# Patient Record
Sex: MALE | Race: BLACK OR AFRICAN AMERICAN | NOT HISPANIC OR LATINO | Employment: STUDENT | URBAN - METROPOLITAN AREA
[De-identification: names, ages, dates, MRNs, and addresses within clinical notes are randomized per-mention and may not be internally consistent; named-entity substitution may affect disease eponyms.]

---
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
## 2023-04-14 ENCOUNTER — OFFICE VISIT (OUTPATIENT)
Dept: URGENT CARE | Facility: CLINIC | Age: 23
End: 2023-04-14
Payer: COMMERCIAL

## 2023-04-14 VITALS
BODY MASS INDEX: 24.44 KG/M2 | DIASTOLIC BLOOD PRESSURE: 56 MMHG | HEART RATE: 69 BPM | TEMPERATURE: 99 F | RESPIRATION RATE: 20 BRPM | SYSTOLIC BLOOD PRESSURE: 109 MMHG | OXYGEN SATURATION: 98 % | WEIGHT: 165 LBS | HEIGHT: 69 IN

## 2023-04-14 DIAGNOSIS — L73.0 ACNE KELOID: Primary | ICD-10-CM

## 2023-04-14 PROCEDURE — 99213 PR OFFICE/OUTPT VISIT, EST, LEVL III, 20-29 MIN: ICD-10-PCS | Mod: S$GLB,,,

## 2023-04-14 PROCEDURE — 99213 OFFICE O/P EST LOW 20 MIN: CPT | Mod: S$GLB,,,

## 2023-04-14 RX ORDER — TRIAMCINOLONE ACETONIDE 1 MG/G
OINTMENT TOPICAL 2 TIMES DAILY
Qty: 30 G | Refills: 0 | Status: SHIPPED | OUTPATIENT
Start: 2023-04-14 | End: 2023-04-21

## 2023-04-14 RX ORDER — CLINDAMYCIN AND BENZOYL PEROXIDE 10; 50 MG/G; MG/G
GEL TOPICAL 2 TIMES DAILY
Qty: 35 G | Refills: 0 | Status: SHIPPED | OUTPATIENT
Start: 2023-04-14 | End: 2023-04-28

## 2023-04-14 NOTE — PATIENT INSTRUCTIONS
A referral for Dermatology has been placed. Call [phone] to schedule an appointment.     - You must understand that you have received an Urgent Care treatment only and that you may be released before all of your medical problems are known or treated.   - You, the patient, will arrange for follow up care as instructed.   - If your condition worsens or fails to improve we recommend that you receive another evaluation at the ER immediately or contact your PCP to discuss your concerns or return here.   - Follow up with your PCP or specialty clinic as directed in the next 1-2 weeks if not improved or as needed.  You can call [phone] to schedule an appointment with the appropriate provider.    If your symptoms do not improve or worsen, go to the emergency room immediately.

## 2023-04-14 NOTE — PROGRESS NOTES
"Subjective:      Patient ID: Irasema Hidalgo Jr. is a 22 y.o. male.    Vitals:  height is 5' 9" (1.753 m) and weight is 74.8 kg (165 lb). His temperature is 98.5 °F (36.9 °C). His blood pressure is 109/56 (abnormal) and his pulse is 69. His respiration is 20 and oxygen saturation is 98%.     Chief Complaint: Rash    This is a 22 y.o. male who presents today with a chief complaint of rash on the back of neck. Pt states that the rash [place] and that's when the rash appeared. He states that it is sometimes itchy. No pain. Has not spread. That was his first time getting a haircut and has not had another one since.       Rash  This is a new problem. The current episode started more than 1 month ago. The problem is unchanged. The affected locations include the neck. The rash is characterized by itchiness and redness. He was exposed to nothing. Pertinent negatives include no congestion, cough, eye pain, fatigue or vomiting. Past treatments include nothing. The treatment provided no relief.     Constitution: Negative for sweating and fatigue.   HENT:  Negative for ear pain and congestion.    Eyes:  Negative for eye pain and eye redness.   Respiratory:  Negative for cough.    Gastrointestinal:  Negative for nausea and vomiting.   Skin:  Positive for rash. Negative for erythema and hives.   Allergic/Immunologic: Positive for itching. Negative for hives.   Neurological:  Negative for disorientation and altered mental status.   Psychiatric/Behavioral:  Negative for altered mental status and disorientation.     Objective:     Physical Exam   Constitutional: He is oriented to person, place, and time. He appears well-developed.      Comments:Patient sits comfortably in exam chair. Answers questions in complete sentences. Does not show any signs of distress or discoloration.        HENT:   Head: Normocephalic and atraumatic. Head is without abrasion, without contusion and without " laceration.       Ears:   Right Ear: External ear normal.   Left Ear: External ear normal.   Nose: Nose normal.   Mouth/Throat: Oropharynx is clear and moist and mucous membranes are normal.   Multiple firm nodules noted to the back of the back scalp. No surrounding erythema or swelling.       Comments: Multiple firm nodules noted to the back of the back scalp. No surrounding erythema or swelling.   Eyes: Conjunctivae, EOM and lids are normal. Pupils are equal, round, and reactive to light.   Neck: Trachea normal and phonation normal. Neck supple.   Cardiovascular: Normal rate, regular rhythm and normal heart sounds.   Pulmonary/Chest: Effort normal and breath sounds normal. No stridor. No respiratory distress.   Musculoskeletal: Normal range of motion.         General: Normal range of motion.   Neurological: He is alert and oriented to person, place, and time.   Skin: Skin is warm, dry, intact and rash. Capillary refill takes less than 2 seconds. No abrasion, No burn, No bruising, No erythema and No ecchymosis   Psychiatric: His speech is normal and behavior is normal. Judgment and thought content normal.   Nursing note and vitals reviewed.      Assessment:     1. Acne keloid        Plan:     Patient will try topical steroid and antibiotic for 1-2 weeks. If symptoms do not resolved he will f/u with derm. Referral already placed and phone number given.     Acne keloid  -     triamcinolone acetonide 0.1% (KENALOG) 0.1 % ointment; Apply topically 2 (two) times daily. for 7 days  Dispense: 30 g; Refill: 0  -     Ambulatory referral/consult to Dermatology  -     clindamycin-benzoyl peroxide (BENZACLIN) gel; Apply topically 2 (two) times daily. for 14 days  Dispense: 35 g; Refill: 0                  Patient Instructions   A referral for Dermatology has been placed. Call [phone] to schedule an appointment.     - You must understand that you have received an Urgent Care treatment only and that you may be released  before all of your medical problems are known or treated.   - You, the patient, will arrange for follow up care as instructed.   - If your condition worsens or fails to improve we recommend that you receive another evaluation at the ER immediately or contact your PCP to discuss your concerns or return here.   - Follow up with your PCP or specialty clinic as directed in the next 1-2 weeks if not improved or as needed.  You can call [phone] to schedule an appointment with the appropriate provider.    If your symptoms do not improve or worsen, go to the emergency room immediately.

## 2023-06-01 ENCOUNTER — TELEPHONE (OUTPATIENT)
Dept: SPORTS MEDICINE | Facility: CLINIC | Age: 23
End: 2023-06-01
Payer: COMMERCIAL

## 2023-06-01 NOTE — TELEPHONE ENCOUNTER
"Attempted to contact pt & pt's mother, Tc.. Left voicemail. Called to obtain additional information regarding his injury. Asked pt/s pt's mother to return call to clinic at [phone].     ==  Decision tree response:  "Are you seeking a second opinion or have you had surgery within the last year for this problem?"      Yes       "Have you been told you need a surgery or do you want surgery?"      Yes        "

## 2023-06-02 ENCOUNTER — TELEPHONE (OUTPATIENT)
Dept: SPORTS MEDICINE | Facility: CLINIC | Age: 23
End: 2023-06-02
Payer: COMMERCIAL

## 2023-06-02 NOTE — TELEPHONE ENCOUNTER
Spoke c pt's mother. 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 He has been dealing c R knee pain for the past 5 months. He has had 2 MRIs & completed physical therapy with initial improvement but pain has since worsened. She confirmed that pt has exhausted conservative treatment & is requesting surgical consult. R/s appt c Dr. Rees 06/08/23 to Dr. Rodriguez 06/06/23. Confirmed appt date, time, location. Pt will be bringing MRI discs for review. Pt/Pt's mother will call c additional questions/concerns in interim. Pt expressed understanding & was thankful.

## 2023-06-02 NOTE — TELEPHONE ENCOUNTER
"2nd attempt.     ==  Attempted to contact pt. Left voicemail. Called to obtain additional information regarding his injury. Asked pt/s pt's mother to return call to clinic at [phone].     MyChart sent.      ==  Decision tree response:  "Are you seeking a second opinion or have you had surgery within the last year for this problem?"      Yes        "Have you been told you need a surgery or do you want surgery?"      Yes      "

## 2023-06-06 ENCOUNTER — OFFICE VISIT (OUTPATIENT)
Dept: SPORTS MEDICINE | Facility: CLINIC | Age: 23
End: 2023-06-06
Payer: COMMERCIAL

## 2023-06-06 ENCOUNTER — HOSPITAL ENCOUNTER (OUTPATIENT)
Dept: RADIOLOGY | Facility: HOSPITAL | Age: 23
Discharge: HOME | End: 2023-06-06
Attending: ORTHOPAEDIC SURGERY
Payer: COMMERCIAL

## 2023-06-06 VITALS
WEIGHT: 174 LBS | DIASTOLIC BLOOD PRESSURE: 79 MMHG | BODY MASS INDEX: 25.77 KG/M2 | HEIGHT: 69 IN | HEART RATE: 60 BPM | SYSTOLIC BLOOD PRESSURE: 113 MMHG

## 2023-06-06 DIAGNOSIS — M25.561 RIGHT KNEE PAIN, UNSPECIFIED CHRONICITY: Primary | ICD-10-CM

## 2023-06-06 DIAGNOSIS — M25.561 RIGHT KNEE PAIN, UNSPECIFIED CHRONICITY: ICD-10-CM

## 2023-06-06 PROCEDURE — 3078F PR MOST RECENT DIASTOLIC BLOOD PRESSURE < 80 MM HG: ICD-10-PCS | Mod: CPTII,S$GLB,, | Performed by: ORTHOPAEDIC SURGERY

## 2023-06-06 PROCEDURE — 3074F PR MOST RECENT SYSTOLIC BLOOD PRESSURE < 130 MM HG: ICD-10-PCS | Mod: CPTII,S$GLB,, | Performed by: ORTHOPAEDIC SURGERY

## 2023-06-06 PROCEDURE — 1159F PR MEDICATION LIST DOCUMENTED IN MEDICAL RECORD: ICD-10-PCS | Mod: CPTII,S$GLB,, | Performed by: ORTHOPAEDIC SURGERY

## 2023-06-06 PROCEDURE — 99999 PR PBB SHADOW E&M-EST. PATIENT-LVL III: ICD-10-PCS | Mod: PBBFAC,,, | Performed by: ORTHOPAEDIC SURGERY

## 2023-06-06 PROCEDURE — 1159F MED LIST DOCD IN RCRD: CPT | Mod: CPTII,S$GLB,, | Performed by: ORTHOPAEDIC SURGERY

## 2023-06-06 PROCEDURE — 99999 PR PBB SHADOW E&M-EST. PATIENT-LVL III: CPT | Mod: PBBFAC,,, | Performed by: ORTHOPAEDIC SURGERY

## 2023-06-06 PROCEDURE — 3078F DIAST BP <80 MM HG: CPT | Mod: CPTII,S$GLB,, | Performed by: ORTHOPAEDIC SURGERY

## 2023-06-06 PROCEDURE — 3074F SYST BP LT 130 MM HG: CPT | Mod: CPTII,S$GLB,, | Performed by: ORTHOPAEDIC SURGERY

## 2023-06-06 PROCEDURE — 99203 PR OFFICE/OUTPT VISIT, NEW, LEVL III, 30-44 MIN: ICD-10-PCS | Mod: S$GLB,,, | Performed by: ORTHOPAEDIC SURGERY

## 2023-06-06 PROCEDURE — 3008F BODY MASS INDEX DOCD: CPT | Mod: CPTII,S$GLB,, | Performed by: ORTHOPAEDIC SURGERY

## 2023-06-06 PROCEDURE — 99203 OFFICE O/P NEW LOW 30 MIN: CPT | Mod: S$GLB,,, | Performed by: ORTHOPAEDIC SURGERY

## 2023-06-06 PROCEDURE — 3008F PR BODY MASS INDEX (BMI) DOCUMENTED: ICD-10-PCS | Mod: CPTII,S$GLB,, | Performed by: ORTHOPAEDIC SURGERY

## 2023-06-12 ENCOUNTER — CLINICAL SUPPORT (OUTPATIENT)
Dept: REHABILITATION | Facility: HOSPITAL | Age: 23
End: 2023-06-12
Payer: COMMERCIAL

## 2023-06-12 DIAGNOSIS — M25.561 ACUTE PAIN OF RIGHT KNEE: ICD-10-CM

## 2023-06-12 DIAGNOSIS — R26.9 GAIT ABNORMALITY: ICD-10-CM

## 2023-06-12 DIAGNOSIS — M25.661 DECREASED RANGE OF MOTION (ROM) OF RIGHT KNEE: ICD-10-CM

## 2023-06-12 PROCEDURE — 97110 THERAPEUTIC EXERCISES: CPT

## 2023-06-12 PROCEDURE — 97161 PT EVAL LOW COMPLEX 20 MIN: CPT

## 2023-06-14 ENCOUNTER — CLINICAL SUPPORT (OUTPATIENT)
Dept: REHABILITATION | Facility: HOSPITAL | Age: 23
End: 2023-06-14
Payer: COMMERCIAL

## 2023-06-14 DIAGNOSIS — R26.9 GAIT ABNORMALITY: ICD-10-CM

## 2023-06-14 DIAGNOSIS — M25.561 ACUTE PAIN OF RIGHT KNEE: Primary | ICD-10-CM

## 2023-06-14 DIAGNOSIS — M25.661 DECREASED RANGE OF MOTION (ROM) OF RIGHT KNEE: ICD-10-CM

## 2023-06-14 PROCEDURE — 97530 THERAPEUTIC ACTIVITIES: CPT

## 2023-06-14 PROCEDURE — 97110 THERAPEUTIC EXERCISES: CPT

## 2023-06-14 PROCEDURE — 97112 NEUROMUSCULAR REEDUCATION: CPT

## 2023-06-14 NOTE — PLAN OF CARE
[facility]  Physical Therapy Initial Evaluation    Date: 6/12/2023   Name: Irasema Hidalgo Jr.  Clinic Number: [id number]    Therapy Diagnosis:   Encounter Diagnoses   Name Primary?    Acute pain of right knee     Gait abnormality     Decreased range of motion (ROM) of right knee      Physician: Chidi Rodriguez MDdd    Physician Orders: PT Eval and Treat   Medical Diagnosis from Referral: Right knee pain, unspecified chronicity [M25.561]  Evaluation Date: 6/12/2023d  Authorization Period Expiration: 6/5/24  Plan of Care Expiration: 9/1/23  Progress Note Due: 7/26/23  Visit # / Visits authorized: 1/ 1   FOTO Follow Up:   FOTO Follow UP:     Precautions: Standard    Time In: 10:15 AM   Time Out: 11:10 AM   Total Appointment Time (timed & untimed codes): 55 minutes    Subjective     Date of onset: 3 months ago   History of current condition - Irasema reports: [place] he felt an incidence of posterior pain knee and swelling while sprinting. He did some PT at the time that he felt helped a bit was not able to complete the season. Since then he has not noticed much pain except occasional pain while performing stairs and walking. Has not been performing any basketball activities since the injury and since returning home. 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 Was told that current issue has to do with his meniscus. Notes that he feels stiffer in ROM on affected side especially with prolonged sitting.     Falls: none noted     Imaging Some reported that suggested meniscal irritation     Prior Therapy: some for current issue.   Exercise Routine/Sport Participation: professional  in [place]   Social History: unremarkable  Prior Level of Function: Full participation in ADL's and basketball activities   Current Level of Function: Unable to perform basketball activities d/t knee pain and feelings  of stiffness.     Pain:  Current 0/10, worst 5/10, best 0/10   Location: right knee  Description: Sharp  Aggravating Factors: Walking and sprinting and jumping  Easing Factors: rest    Pts goals: return to basketball activities without pain or instability       Medical History:   Past Medical History:   Diagnosis Date    Asthma        Surgical History:   Irasema Hidalgo Jr.  has no past surgical history on file.    Medications:   Irasema has a current medication list which includes the following prescription(s): albuterol, clindamycin-benzoyl peroxide, and triamcinolone acetonide 0.1%.    Allergies:   Review of patient's allergies indicates:  No Known Allergies     Objective     Posture: WNL    Functional Tests:  Gait: WNL  Squat: B/L valgus noted, decreased anterior tibial progression, posterior weight shift b/l   SL Squat Test: R: dynamic valgus ; L WNL      Knee Passive Range of Motion:   Right  Left    Flexion 130 130   Extension 3 5       Hip Passive Range of Motion:   Right  Left    Flexion 95 95   Extension 10 10   Ext. Rotation 55 55   Int. Rotation 25 25       Ankle Passive Range of Motion:   Right  Left    Dorsiflexion 35 37   1st MTP Extension WNL WNL       Lower Extremity Strength:   Right  Left    Quadriceps: 82.8 lb 83.0 lb   Hamstring in seated 62.0 lb  57.0 lb    Iliopsoas (sitting): 5/5 5/5   Hip extension:  4/5 4/5   Supine Abduction  36.6 lb 31.5 lb      Calf Raise Endurance Test on Metronome  R: 33   L: 33     Special Tests:   Right Left   Valgus Stress  - -   Varus Stress  - -   Lachman's  - -   Posterior Drawer - -      Right Left   Thessaly's Test - -   McMurrays  - -   Apleys Compression/  Distraction - -      Right Left   Patella Grind + +   Patella Apprehension - -       Joint Mobility: Slight TF limitation in extension R      Palpation: not TTP     Flexibility:    Ely's test: R + ; L +    Hamstrings: R - ; L  -             Treatment     Treatment Time In: 11:00 AM   Treatment Time  Out: 11:10 AM   Total Treatment time separate from Evaluation: 10 minutes     Irasema received the treatments listed below:      Therapeutic exercises to develop strength, endurance, ROM, and flexibility for 10 minutes including:  Pin and Stretch   Squat Retraining w/GTB     Home Exercises and Patient Education Provided     Education provided:   - Prognosis, activity modification, goals for therapy, role of therapy for care, exercises/HEP    Written Home Exercises Provided: Yes  Exercises were reviewed and Irasema was able to demonstrate them prior to the end of the session.   Pt received a written copy of exercises to perform at home. Irasema demonstrated good  understanding of the education provided.     See EMR under patient instructions for exercises given.     Assessment     Irasema is a 22 y.o. male referred to outpatient Physical Therapy with s/s consistent with patellafemoral pain syndrome and meniscal irritation. He presents with limitations in ankle ROM and motor control of functional movements including double and single leg squatting. He will benefit from skilled OP PT to address these limitations and return to professional basketball.       Pt will benefit from skilled outpatient Physical Therapy to address the deficits stated above and in the chart below, provide pt/family education, and to maximize pt's level of independence. Pt prognosis is Excellent.     Plan of care discussed with patient: Yes  Pt's spiritual, cultural and educational needs considered and patient is agreeable to the plan of care and goals as stated below:       Anticipated Barriers for therapy: none       Medical Necessity is demonstrated by the following  History  Co-morbidities and personal factors that may impact the plan of care Co-morbidities:   See PMH     Personal Factors:   no deficits     low   Examination  Body Structures and Functions, activity limitations and participation restrictions that may impact the plan of  care Body Regions:   lower extremities    Body Systems:    ROM  strength  gross coordinated movement  balance    Participation Restrictions:   Professional basketball     Activity limitations:   Learning and applying knowledge  no deficits    General Tasks and Commands  no deficits    Communication  no deficits    Mobility  Running/cutting     Self care  no deficits    Domestic Life  no deficits    Interactions/Relationships  no deficits    Life Areas  no deficits    Community and Social Life  no deficits         low   Clinical Presentation stable and uncomplicated low   Decision Making/ Complexity Score: low     Goals:  Short Term Goals: 4-6 weeks  1. Pt will be compliant with HEP 50% of prescribed amount.   2. The pt to demo improvement in b/l squatting technique with minimal valgus b/l   3.  Pt to demo single leg squat to appropriate depth without dynamic valgus     Long Term Goals: 8-12 weeks   Pt will be compliant with % of prescribed amount.   Pt will demonstrate uncompensated pain free running mechanics x 10:0   Pt will demo good tolerance to cutting/change of direction exercises to demo improved tolerance to basketball activities.   The pt will report full participation in ADLs and IADLs without restrictions related to R knee .     Plan   Plan of care Certification: 6/12/2023 to 9/1/23.    Outpatient Physical Therapy 2 times weekly for 12 weeks to include the following interventions: Manual Therapy, Neuromuscular Re-ed, Patient Education, Therapeutic Activities, and Therapeutic Exercise.     Erik Juarez, PT , DPT

## 2023-06-15 NOTE — PROGRESS NOTES
[facility]   Physical Therapy Treatment Note     Name: Irasema Hidalgo Jr.  Clinic Number: [id number]    Therapy Diagnosis:   Encounter Diagnoses   Name Primary?    Acute pain of right knee Yes    Gait abnormality     Decreased range of motion (ROM) of right knee      Physician: Chidi Rodriguez MD    Visit Date: 6/14/2023       Physician Orders: PT Eval and Treat   Medical Diagnosis from Referral: Right knee pain, unspecified chronicity [M25.561]  Evaluation Date: 6/12/2023d  Authorization Period Expiration: 6/5/24  Plan of Care Expiration: 9/1/23  Progress Note Due: 7/26/23  Visit # / Visits authorized: 1 / 20   FOTO Follow Up:   FOTO Follow UP:      Precautions: Standard     Time In: 8:15 AM   Time Out: 9:25 AM    Total Appointment Time (timed & untimed codes): 70 minutes    FOTO 1st:   FOTO 3rd:  FOTO 10th:      SUBJECTIVE     Pt reports: has been working on squatting.    He was compliant with home exercise program.  Response to previous treatment: too early   Functional change: too early     Pain: 0/10  Location: right knee      OBJECTIVE     Objective Measures updated at progress report unless specified.     Treatment       Irasema received the treatments listed below:      Therapeutic exercises to develop strength, endurance, ROM, and flexibility for 26 minutes including:  Pin and stretch 3 x 15   Watt Bike Intervals 10 x :30 hard/:30 recovery   Single Leg Leg Press 4 x 6 @ 160# per leg     Manual therapy techniques: Joint mobilizations were applied to the: R knee for 00 minutes, including:      Therapeutic activities to improve functional performance for 24  minutes, including:  Squat retraining, GTB, bench cue for knee translation 3 x 15   TRX Single Leg Squat 3 x 10   Sneaky Calf Raise 3 x 10 + 25# DB's     Neuromuscular re-education activities to improve: Balance, Coordination, Kinesthetic, and Sense for 20 minutes. The following activities were included:  Palloff Press  Walkouts 3 x 15 13# cable   Walking Single Leg Squat GTB 3 x LOT         Patient Education and Home Exercises     Home Exercises Provided and Patient Education Provided     Education provided:   - Decreased dynamic valgus, importance of regaining conditioning, muscular tolerance to basketball activities     Written Home Exercises Provided: Patient instructed to cont prior HEP. Exercises were reviewed and Irasema was able to demonstrate them prior to the end of the session.  Irasema demonstrated good  understanding of the education provided. See EMR under Patient Instructions for exercises provided during therapy sessions    ASSESSMENT     Irasema with some dynamic valgus b/l with squatting movements. Some navicular drop noted on L foot and decreased active DF, plan to assess ankle joint and ROM next session.     Irasema Is progressing well towards his goals.     Pt prognosis is Excellent.     Pt will continue to benefit from skilled outpatient physical therapy to address the deficits listed in the problem list box on initial evaluation, provide pt/family education and to maximize pt's level of independence in the home and community environment.     Pt's spiritual, cultural and educational needs considered and pt agreeable to plan of care and goals.     Anticipated barriers to physical therapy: none     Goals:   Short Term Goals: 4-6 weeks  1. Pt will be compliant with HEP 50% of prescribed amount.   2. The pt to demo improvement in b/l squatting technique with minimal valgus b/l   3.  Pt to demo single leg squat to appropriate depth without dynamic valgus      Long Term Goals: 8-12 weeks   Pt will be compliant with % of prescribed amount.   Pt will demonstrate uncompensated pain free running mechanics x 10:0   Pt will demo good tolerance to cutting/change of direction exercises to demo improved tolerance to basketball activities.   The pt will report full participation in ADLs and IADLs without  restrictions related to R knee .      Plan   Plan of care Certification: 6/12/2023 to 9/1/23.     Outpatient Physical Therapy 2 times weekly for 12 weeks to include the following interventions: Manual Therapy, Neuromuscular Re-ed, Patient Education, Therapeutic Activities, and Therapeutic Exercise.     Erik Juarez, PT PT, DPT

## 2023-06-20 ENCOUNTER — CLINICAL SUPPORT (OUTPATIENT)
Dept: REHABILITATION | Facility: HOSPITAL | Age: 23
End: 2023-06-20
Payer: COMMERCIAL

## 2023-06-20 DIAGNOSIS — M25.561 ACUTE PAIN OF RIGHT KNEE: Primary | ICD-10-CM

## 2023-06-20 DIAGNOSIS — M25.661 DECREASED RANGE OF MOTION (ROM) OF RIGHT KNEE: ICD-10-CM

## 2023-06-20 DIAGNOSIS — R26.9 GAIT ABNORMALITY: ICD-10-CM

## 2023-06-20 PROCEDURE — 97140 MANUAL THERAPY 1/> REGIONS: CPT

## 2023-06-20 PROCEDURE — 97110 THERAPEUTIC EXERCISES: CPT

## 2023-06-20 PROCEDURE — 97530 THERAPEUTIC ACTIVITIES: CPT

## 2023-06-20 NOTE — PROGRESS NOTES
[facility]   Physical Therapy Treatment Note     Name: Irasema Hidalgo Jr.  Clinic Number: [id number]    Therapy Diagnosis:   Encounter Diagnoses   Name Primary?    Acute pain of right knee Yes    Gait abnormality     Decreased range of motion (ROM) of right knee      Physician: Chidi Rodriguez MD    Visit Date: 6/20/2023       Physician Orders: PT Eval and Treat   Medical Diagnosis from Referral: Right knee pain, unspecified chronicity [M25.561]  Evaluation Date: 6/12/2023d  Authorization Period Expiration: 6/5/24  Plan of Care Expiration: 9/1/23  Progress Note Due: 7/26/23  Visit # / Visits authorized: 2 / 20   FOTO Follow Up:   FOTO Follow UP:      Precautions: Standard     Time In: 7:10 AM   Time Out: 8:15 AM    Total Appointment Time (timed & untimed codes): 65 minutes    FOTO 1st:   FOTO 3rd:  FOTO 10th:      SUBJECTIVE     Pt reports: over the weekend played pickup basketball on 2 occasions and noticed some mild pain in his R Achilles during cutting/change of direction.     He was compliant with home exercise program.  Response to previous treatment: too early   Functional change: too early     Pain: 0/10  Location: right knee      OBJECTIVE     Objective Measures updated at progress report unless specified.     Treatment       Irasema received the treatments listed below:      Therapeutic exercises to develop strength, endurance, ROM, and flexibility for 18 minutes including:  Trap Bar Deadlift 3 x 10 @ 95#/rest 1:00   Single Leg Leg Press 4 x 6 @ 180# per leg   Ball Assisted [place] 3 x 6    NP  Watt Bike Intervals 10 x :30 hard/:30 recovery       Manual therapy techniques: Joint mobilizations were applied to the: R knee for 12 minutes, including:  Ankle Assessment   TCJ Distraction Grade V     Therapeutic activities to improve functional performance for 00  minutes, including:      Neuromuscular re-education activities to improve: Balance, Coordination, Kinesthetic, and  Sense for 35 minutes. The following activities were included:  Achilles Tendon Isometric Loading Protocol   -5 x :45 Heel Raise Isometric @ neutral DF   -:45 side plank between sets     A1. Single Leg Squat 3 x 10 per leg   A2. Birddog 3 x 20 alternating reps   A3. Foot Plank 3 x :30 per leg     Palloff Press Walkouts 3 x 15 13# cable   Walking Single Leg Squat GTB 3 x LOT         Patient Education and Home Exercises     Home Exercises Provided and Patient Education Provided     Education provided:   - Decreased dynamic valgus, importance of regaining conditioning, muscular tolerance to basketball activities     Written Home Exercises Provided: Patient instructed to cont prior HEP. Exercises were reviewed and Irasema was able to demonstrate them prior to the end of the session.  Irasema demonstrated good  understanding of the education provided. See EMR under Patient Instructions for exercises provided during therapy sessions    ASSESSMENT     Irasema presents today with s/s consistent with mild Achilles tendinopathy. Responded well to isometric loading protocol with no pain during single leg hopping following intervention. Education provided to hold from basketball activities until he follows up with me later in the week and to perform isometric loading protocol the next few days to continue to improve symptoms. Plan to gradually introduce landing mechanics/energy storage exercises as tolerated next session then energy storage/release for the ankle in coming sessions as indicated.     Irasema Is progressing well towards his goals.     Pt prognosis is Excellent.     Pt will continue to benefit from skilled outpatient physical therapy to address the deficits listed in the problem list box on initial evaluation, provide pt/family education and to maximize pt's level of independence in the home and community environment.     Pt's spiritual, cultural and educational needs considered and pt agreeable to plan of care  and goals.     Anticipated barriers to physical therapy: none     Goals:   Short Term Goals: 4-6 weeks  1. Pt will be compliant with HEP 50% of prescribed amount.   2. The pt to demo improvement in b/l squatting technique with minimal valgus b/l   3.  Pt to demo single leg squat to appropriate depth without dynamic valgus      Long Term Goals: 8-12 weeks   Pt will be compliant with % of prescribed amount.   Pt will demonstrate uncompensated pain free running mechanics x 10:0   Pt will demo good tolerance to cutting/change of direction exercises to demo improved tolerance to basketball activities.   The pt will report full participation in ADLs and IADLs without restrictions related to R knee .      Plan   Plan of care Certification: 6/12/2023 to 9/1/23.     Outpatient Physical Therapy 2 times weekly for 12 weeks to include the following interventions: Manual Therapy, Neuromuscular Re-ed, Patient Education, Therapeutic Activities, and Therapeutic Exercise.     Erik Juarez, PT, DPT

## 2023-06-28 ENCOUNTER — CLINICAL SUPPORT (OUTPATIENT)
Dept: REHABILITATION | Facility: HOSPITAL | Age: 23
End: 2023-06-28
Payer: COMMERCIAL

## 2023-06-28 DIAGNOSIS — M25.561 ACUTE PAIN OF RIGHT KNEE: Primary | ICD-10-CM

## 2023-06-28 DIAGNOSIS — R26.9 GAIT ABNORMALITY: ICD-10-CM

## 2023-06-28 DIAGNOSIS — M25.661 DECREASED RANGE OF MOTION (ROM) OF RIGHT KNEE: ICD-10-CM

## 2023-06-28 PROCEDURE — 97530 THERAPEUTIC ACTIVITIES: CPT

## 2023-06-28 NOTE — PROGRESS NOTES
"[facility]   Physical Therapy Treatment Note     Name: Irasema Hidalgo Jr.  Clinic Number: [id number]    Therapy Diagnosis:   Encounter Diagnoses   Name Primary?    Acute pain of right knee Yes    Gait abnormality     Decreased range of motion (ROM) of right knee      Physician: Chidi Rodriguez MD    Visit Date: 6/28/2023       Physician Orders: PT Eval and Treat   Medical Diagnosis from Referral: Right knee pain, unspecified chronicity [M25.561]  Evaluation Date: 6/12/2023d  Authorization Period Expiration: 6/5/24  Plan of Care Expiration: 9/1/23  Progress Note Due: 7/26/23  Visit # / Visits authorized: 3 / 20   FOTO Follow Up:   FOTO Follow UP:      Precautions: Standard     Time In: 8:10 AM   Time Out: 9:00 AM    Total Appointment Time (timed & untimed codes): 50 minutes    FOTO 1st:   FOTO 3rd:  FOTO 10th:      SUBJECTIVE     Pt reports: Achilles has been feeling good, has been doing isometrics. Planning on playing light pickup basketball on Friday.     He was compliant with home exercise program.  Response to previous treatment: too early   Functional change: too early     Pain: 0/10  Location: right knee      OBJECTIVE     Objective Measures updated at progress report unless specified.     Treatment       Irasema received the treatments listed below:      Therapeutic exercises to develop strength, endurance, ROM, and flexibility for 10 minutes including:  Bike x 7:00 to improve functional endurance and tissue extensibility   Dynamic warmup     NP  Watt Bike Intervals 10 x :30 hard/:30 recovery   Trap Bar Deadlift 3 x 10 @ 95#/rest 1:00   Single Leg Leg Press 4 x 6 @ 180# per leg   Ball Assisted [place] 3 x 6      Manual therapy techniques: Joint mobilizations were applied to the: R knee for 00 minutes, including:    NP  Ankle Assessment   TCJ Distraction Grade V     Therapeutic activities to improve functional performance for 40  minutes, including:  Drop Jump 12" 3 x 10 " "  Box Jump 12" 3 x 10   Skater Hop + Med-ball 3 x 5 per direction   [place] hop overs lateral 2 x 10 per direction   TCJ Banded mob 2 x 20 per leg   Single Leg Broad Jump 5 x 3 per leg     Neuromuscular re-education activities to improve: Balance, Coordination, Kinesthetic, and Sense for 00 minutes. The following activities were included:      NP  Achilles Tendon Isometric Loading Protocol   -5 x :45 Heel Raise Isometric @ neutral DF   -:45 side plank between sets     A1. Single Leg Squat 3 x 10 per leg   A2. Birddog 3 x 20 alternating reps   A3. Foot Plank 3 x :30 per leg     Palloff Press Walkouts 3 x 15 13# cable   Walking Single Leg Squat GTB 3 x LOT         Patient Education and Home Exercises     Home Exercises Provided and Patient Education Provided     Education provided:   - Decreased dynamic valgus, importance of regaining conditioning, muscular tolerance to basketball activities     Written Home Exercises Provided: Patient instructed to cont prior HEP. Exercises were reviewed and Irasema was able to demonstrate them prior to the end of the session.  Irasema demonstrated good  understanding of the education provided. See EMR under Patient Instructions for exercises provided during therapy sessions    ASSESSMENT     Irasema with good carryover in range and squat technique. Mild valgus b/l during depth drop that he was able to correct with cueing and practice. Good tolerance to single leg jumping progressions. Plan to start to add in change of direction and sport specific drills as tolerated.     Irasema Is progressing well towards his goals.     Pt prognosis is Excellent.     Pt will continue to benefit from skilled outpatient physical therapy to address the deficits listed in the problem list box on initial evaluation, provide pt/family education and to maximize pt's level of independence in the home and community environment.     Pt's spiritual, cultural and educational needs considered and pt " agreeable to plan of care and goals.     Anticipated barriers to physical therapy: none     Goals:   Short Term Goals: 4-6 weeks  1. Pt will be compliant with HEP 50% of prescribed amount.   2. The pt to demo improvement in b/l squatting technique with minimal valgus b/l   3.  Pt to demo single leg squat to appropriate depth without dynamic valgus      Long Term Goals: 8-12 weeks   Pt will be compliant with % of prescribed amount.   Pt will demonstrate uncompensated pain free running mechanics x 10:0   Pt will demo good tolerance to cutting/change of direction exercises to demo improved tolerance to basketball activities.   The pt will report full participation in ADLs and IADLs without restrictions related to R knee .      Plan   Plan of care Certification: 6/12/2023 to 9/1/23.     Outpatient Physical Therapy 2 times weekly for 12 weeks to include the following interventions: Manual Therapy, Neuromuscular Re-ed, Patient Education, Therapeutic Activities, and Therapeutic Exercise.     Erik Juarez, PT, DPT

## 2023-07-03 ENCOUNTER — CLINICAL SUPPORT (OUTPATIENT)
Dept: REHABILITATION | Facility: HOSPITAL | Age: 23
End: 2023-07-03
Payer: COMMERCIAL

## 2023-07-03 DIAGNOSIS — M25.561 ACUTE PAIN OF RIGHT KNEE: Primary | ICD-10-CM

## 2023-07-03 DIAGNOSIS — R26.9 GAIT ABNORMALITY: ICD-10-CM

## 2023-07-03 DIAGNOSIS — M25.661 DECREASED RANGE OF MOTION (ROM) OF RIGHT KNEE: ICD-10-CM

## 2023-07-03 PROCEDURE — 97530 THERAPEUTIC ACTIVITIES: CPT

## 2023-07-03 PROCEDURE — 97110 THERAPEUTIC EXERCISES: CPT

## 2023-07-03 PROCEDURE — 97112 NEUROMUSCULAR REEDUCATION: CPT

## 2023-07-03 NOTE — PROGRESS NOTES
[facility]   Physical Therapy Treatment Note     Name: Irasema Hidalgo Jr.  Clinic Number: [id number]    Therapy Diagnosis:   Encounter Diagnoses   Name Primary?    Acute pain of right knee Yes    Gait abnormality     Decreased range of motion (ROM) of right knee      Physician: Chidi Rodriguez MD    Visit Date: 7/3/2023       Physician Orders: PT Eval and Treat   Medical Diagnosis from Referral: Right knee pain, unspecified chronicity [M25.561]  Evaluation Date: 6/12/2023d  Authorization Period Expiration: 6/5/24  Plan of Care Expiration: 9/1/23  Progress Note Due: 7/26/23  Visit # / Visits authorized: 3 / 20   FOTO Follow Up:   FOTO Follow UP:      Precautions: Standard     Time In: 8:10 AM   Time Out: 9:30 AM   Total Appointment Time (timed & untimed codes): 70 minutes    FOTO 1st:   FOTO 3rd:  FOTO 10th:      SUBJECTIVE     Pt reports: knee and ankle felt good. Played some basketball over the weekend and had no issue but he notes he didn't go as hard as he normally would.     He was compliant with home exercise program.  Response to previous treatment: too early   Functional change: too early     Pain: 0/10  Location: right knee      OBJECTIVE     Objective Measures updated at progress report unless specified.     Treatment       Irasema received the treatments listed below:      Therapeutic exercises to develop strength, endurance, ROM, and flexibility for 28 minutes including:  Bike x 7:00 to improve functional endurance and tissue extensibility   Dynamic warmup   Single Leg Leg Press 4 x 6 @ 180# per leg   Ball Assisted [place] 3 x 8  Fat pad mobs       Manual therapy techniques: Joint mobilizations were applied to the: R knee for 00 minutes, including:        Therapeutic activities to improve functional performance for 28  minutes, including:  DF MWM, therapist block x 15 per leg   Weighted Euro Step throughs x 5   Fast euro step throughs + layup x 5 per direction   Dribble  to euro step through x 5 per direction   Dribble to hesi to layup x 5 per direction       Neuromuscular re-education activities to improve: Balance, Coordination, Kinesthetic, and Sense for 14 minutes. The following activities were included:  SLB on Airex 2 x :30 per leg   Side Plank Clam 3 x 10 GTB             Patient Education and Home Exercises     Home Exercises Provided and Patient Education Provided     Education provided:   - Decreased dynamic valgus, importance of regaining conditioning, muscular tolerance to basketball activities     Written Home Exercises Provided: Patient instructed to cont prior HEP. Exercises were reviewed and Irasema was able to demonstrate them prior to the end of the session.  Irasema demonstrated good  understanding of the education provided. See EMR under Patient Instructions for exercises provided during therapy sessions    ASSESSMENT     Irasema with good tolerance to progression of dynamic basketball activities. Some reported anterior knee sensation at beginning of session that improved with fat pad mobs. Slightly hypomobile fat pad, likely d/t guarding the knee after initial injury but good response to manual therapy today. Continued good progress with CKC strengthening. Plan to progress dynamic activities as tolerated towards full return to sport.     Irasema Is progressing well towards his goals.     Pt prognosis is Excellent.     Pt will continue to benefit from skilled outpatient physical therapy to address the deficits listed in the problem list box on initial evaluation, provide pt/family education and to maximize pt's level of independence in the home and community environment.     Pt's spiritual, cultural and educational needs considered and pt agreeable to plan of care and goals.     Anticipated barriers to physical therapy: none     Goals:   Short Term Goals: 4-6 weeks  1. Pt will be compliant with HEP 50% of prescribed amount.   2. The pt to demo improvement in  b/l squatting technique with minimal valgus b/l   3.  Pt to demo single leg squat to appropriate depth without dynamic valgus      Long Term Goals: 8-12 weeks   Pt will be compliant with % of prescribed amount.   Pt will demonstrate uncompensated pain free running mechanics x 10:0   Pt will demo good tolerance to cutting/change of direction exercises to demo improved tolerance to basketball activities.   The pt will report full participation in ADLs and IADLs without restrictions related to R knee .      Plan   Plan of care Certification: 6/12/2023 to 9/1/23.     Outpatient Physical Therapy 2 times weekly for 12 weeks to include the following interventions: Manual Therapy, Neuromuscular Re-ed, Patient Education, Therapeutic Activities, and Therapeutic Exercise.     Erik Juarez, PT, DPT

## 2023-07-11 ENCOUNTER — CLINICAL SUPPORT (OUTPATIENT)
Dept: REHABILITATION | Facility: HOSPITAL | Age: 23
End: 2023-07-11
Payer: COMMERCIAL

## 2023-07-11 DIAGNOSIS — M25.661 DECREASED RANGE OF MOTION (ROM) OF RIGHT KNEE: ICD-10-CM

## 2023-07-11 DIAGNOSIS — M25.561 ACUTE PAIN OF RIGHT KNEE: Primary | ICD-10-CM

## 2023-07-11 DIAGNOSIS — R26.9 GAIT ABNORMALITY: ICD-10-CM

## 2023-07-11 PROCEDURE — 97110 THERAPEUTIC EXERCISES: CPT

## 2023-07-11 PROCEDURE — 97112 NEUROMUSCULAR REEDUCATION: CPT

## 2023-07-11 PROCEDURE — 97530 THERAPEUTIC ACTIVITIES: CPT

## 2023-07-11 NOTE — PROGRESS NOTES
"[facility]   Physical Therapy Treatment Note     Name: Irasema Hidalgo Jr.  Clinic Number: [id number]    Therapy Diagnosis:   Encounter Diagnoses   Name Primary?    Acute pain of right knee Yes    Gait abnormality     Decreased range of motion (ROM) of right knee      Physician: Chidi Rodriguez MD    Visit Date: 7/11/2023       Physician Orders: PT Eval and Treat   Medical Diagnosis from Referral: Right knee pain, unspecified chronicity [M25.561]  Evaluation Date: 6/12/2023d  Authorization Period Expiration: 6/5/24  Plan of Care Expiration: 9/1/23  Progress Note Due: 7/26/23  Visit # / Visits authorized: 5 / 20   FOTO Follow Up:   FOTO Follow UP:      Precautions: Standard     Time In: 9:00 AM (pt arrived late)   Time Out: 10:00 AM   Total Appointment Time (timed & untimed codes): 60 minutes    FOTO 1st:   FOTO 3rd:  FOTO 10th:      SUBJECTIVE     Pt reports: knee and ankle continues to feel good.     He was compliant with home exercise program.  Response to previous treatment: too early   Functional change: too early     Pain: 0/10  Location: right knee      OBJECTIVE     Objective Measures updated at progress report unless specified.     Treatment       Irasema received the treatments listed below:      Therapeutic exercises to develop strength, endurance, ROM, and flexibility for 18 minutes including:  Bike x 7:00 to improve functional endurance and tissue extensibility   Dynamic warmup       Manual therapy techniques: Joint mobilizations were applied to the: R knee for 00 minutes, including:        Therapeutic activities to improve functional performance for 28  minutes, including:  Curl cut to layup 3 x 5 per direction   Defensive Shuffle 4 x 10 touches in under :15, 5 yards   Depth Drop to Lateral single leg land 3 x 5 per direction from 12"         Neuromuscular re-education activities to improve: Balance, Coordination, Kinesthetic, and Sense for 14 minutes. The following " activities were included:  Mobo Board Shoulder Raises x 25 per leg   Walking Single Leg Squat 1 x LOT GTB             Patient Education and Home Exercises     Home Exercises Provided and Patient Education Provided     Education provided:   - Decreased dynamic valgus, importance of regaining conditioning, muscular tolerance to basketball activities     Written Home Exercises Provided: Patient instructed to cont prior HEP. Exercises were reviewed and Irasema was able to demonstrate them prior to the end of the session.  Irasema demonstrated good  understanding of the education provided. See EMR under Patient Instructions for exercises provided during therapy sessions    ASSESSMENT     Irasema with good tolerance to progression of dynamic basketball activities. Subjective reports of feeling out of shape but otherwise feeling good. Plan to assess strength next week via [employer] to continue to inform return to sport decisions.     Irasema Is progressing well towards his goals.     Pt prognosis is Excellent.     Pt will continue to benefit from skilled outpatient physical therapy to address the deficits listed in the problem list box on initial evaluation, provide pt/family education and to maximize pt's level of independence in the home and community environment.     Pt's spiritual, cultural and educational needs considered and pt agreeable to plan of care and goals.     Anticipated barriers to physical therapy: none     Goals:   Short Term Goals: 4-6 weeks  1. Pt will be compliant with HEP 50% of prescribed amount.   2. The pt to demo improvement in b/l squatting technique with minimal valgus b/l   3.  Pt to demo single leg squat to appropriate depth without dynamic valgus      Long Term Goals: 8-12 weeks   Pt will be compliant with % of prescribed amount.   Pt will demonstrate uncompensated pain free running mechanics x 10:0   Pt will demo good tolerance to cutting/change of direction exercises to demo  improved tolerance to basketball activities.   The pt will report full participation in ADLs and IADLs without restrictions related to R knee .      Plan   Plan of care Certification: 6/12/2023 to 9/1/23.     Outpatient Physical Therapy 2 times weekly for 12 weeks to include the following interventions: Manual Therapy, Neuromuscular Re-ed, Patient Education, Therapeutic Activities, and Therapeutic Exercise.     Erik Juarez, PT, DPT

## 2023-07-13 ENCOUNTER — CLINICAL SUPPORT (OUTPATIENT)
Dept: REHABILITATION | Facility: HOSPITAL | Age: 23
End: 2023-07-13
Payer: COMMERCIAL

## 2023-07-13 DIAGNOSIS — R26.9 GAIT ABNORMALITY: ICD-10-CM

## 2023-07-13 DIAGNOSIS — M25.561 ACUTE PAIN OF RIGHT KNEE: Primary | ICD-10-CM

## 2023-07-13 DIAGNOSIS — M25.661 DECREASED RANGE OF MOTION (ROM) OF RIGHT KNEE: ICD-10-CM

## 2023-07-13 PROCEDURE — 97530 THERAPEUTIC ACTIVITIES: CPT

## 2023-07-13 PROCEDURE — 97110 THERAPEUTIC EXERCISES: CPT

## 2023-07-13 NOTE — PROGRESS NOTES
[facility]   Physical Therapy Treatment Note     Name: Irasema Hidalgo Jr.  Clinic Number: [id number]    Therapy Diagnosis:   Encounter Diagnoses   Name Primary?    Acute pain of right knee Yes    Gait abnormality     Decreased range of motion (ROM) of right knee      Physician: Chidi Rodriguez MD    Visit Date: 7/13/2023       Physician Orders: PT Eval and Treat   Medical Diagnosis from Referral: Right knee pain, unspecified chronicity [M25.561]  Evaluation Date: 6/12/2023d  Authorization Period Expiration: 6/5/24  Plan of Care Expiration: 9/1/23  Progress Note Due: 7/26/23  Visit # / Visits authorized: 6 / 20   FOTO Follow Up:   FOTO Follow UP:      Precautions: Standard     Time In: 11:00 AM   Time Out: 12:00 PM   Total Appointment Time (timed & untimed codes): 60 minutes    FOTO 1st:   FOTO 3rd:  FOTO 10th:      SUBJECTIVE     Pt reports: knee and ankle continues to feel good. Left quad feels tighter than R sometimes.     He was compliant with home exercise program.  Response to previous treatment: too early   Functional change: too early     Pain: 0/10  Location: right knee      OBJECTIVE       Biodex Strength Assessment   R: 142.4 foot/lbs at 60 degrees   L: 179.5 foot/lbs at 60 degrees     See media for full evaluation     Treatment       Irasema received the treatments listed below:      Therapeutic exercises to develop strength, endurance, ROM, and flexibility for 36 minutes including:  Bike x 7:00 to improve functional endurance and tissue extensibility   Dynamic Warmup Series   -25 TKE   -2 x 10 lateral band walk   -Knee hug/quad pull  -Ham scoop/high kick  -Lunge/lateral lunge  -Toe walk/heel walk   -jab step     Biodex Strength Assessment     Manual therapy techniques: Joint mobilizations were applied to the: R knee for 00 minutes, including:        Therapeutic activities to improve functional performance for 24  minutes, including:  RFESS 3 x 8 per leg @ 25# per hand    Pt education regarding gym program   Knee extension 2 x 8 @ 25/30# hammer         Neuromuscular re-education activities to improve: Balance, Coordination, Kinesthetic, and Sense for 00 minutes. The following activities were included:        Patient Education and Home Exercises     Home Exercises Provided and Patient Education Provided     Education provided:   - Decreased dynamic valgus, importance of regaining conditioning, muscular tolerance to basketball activities     Written Home Exercises Provided: Patient instructed to cont prior HEP. Exercises were reviewed and Irasema was able to demonstrate them prior to the end of the session.  Irasema demonstrated good  understanding of the education provided. See EMR under Patient Instructions for exercises provided during therapy sessions    ASSESSMENT     Irasema with ~80% LSI on quad strength at all speeds and symmetrical hamstring strength on Biodex. Education provided as to the importance of quad strength for function and reviewed a detailed gym program for quad strength and function.     Irasema Is progressing well towards his goals.     Pt prognosis is Excellent.     Pt will continue to benefit from skilled outpatient physical therapy to address the deficits listed in the problem list box on initial evaluation, provide pt/family education and to maximize pt's level of independence in the home and community environment.     Pt's spiritual, cultural and educational needs considered and pt agreeable to plan of care and goals.     Anticipated barriers to physical therapy: none     Goals:   Short Term Goals: 4-6 weeks  1. Pt will be compliant with HEP 50% of prescribed amount.   2. The pt to demo improvement in b/l squatting technique with minimal valgus b/l   3.  Pt to demo single leg squat to appropriate depth without dynamic valgus      Long Term Goals: 8-12 weeks   Pt will be compliant with % of prescribed amount.   Pt will demonstrate uncompensated  pain free running mechanics x 10:0   Pt will demo good tolerance to cutting/change of direction exercises to demo improved tolerance to basketball activities.   The pt will report full participation in ADLs and IADLs without restrictions related to R knee .      Plan   Plan of care Certification: 6/12/2023 to 9/1/23.     Outpatient Physical Therapy 2 times weekly for 12 weeks to include the following interventions: Manual Therapy, Neuromuscular Re-ed, Patient Education, Therapeutic Activities, and Therapeutic Exercise.     Erik Juarez, PT, DPT

## 2023-07-17 ENCOUNTER — CLINICAL SUPPORT (OUTPATIENT)
Dept: REHABILITATION | Facility: HOSPITAL | Age: 23
End: 2023-07-17
Payer: COMMERCIAL

## 2023-07-17 DIAGNOSIS — M25.561 ACUTE PAIN OF RIGHT KNEE: Primary | ICD-10-CM

## 2023-07-17 DIAGNOSIS — R26.9 GAIT ABNORMALITY: ICD-10-CM

## 2023-07-17 DIAGNOSIS — M25.661 DECREASED RANGE OF MOTION (ROM) OF RIGHT KNEE: ICD-10-CM

## 2023-07-17 PROCEDURE — 97530 THERAPEUTIC ACTIVITIES: CPT

## 2023-07-17 PROCEDURE — 97112 NEUROMUSCULAR REEDUCATION: CPT

## 2023-07-17 PROCEDURE — 97110 THERAPEUTIC EXERCISES: CPT

## 2023-07-17 NOTE — PROGRESS NOTES
[facility]   Physical Therapy Treatment Note     Name: Irasema Hidalgo Jr.  Clinic Number: [id number]    Therapy Diagnosis:   Encounter Diagnoses   Name Primary?    Acute pain of right knee Yes    Gait abnormality     Decreased range of motion (ROM) of right knee      Physician: Chidi Rodriguez MD    Visit Date: 7/17/2023       Physician Orders: PT Eval and Treat   Medical Diagnosis from Referral: Right knee pain, unspecified chronicity [M25.561]  Evaluation Date: 6/12/2023d  Authorization Period Expiration: 6/5/24  Plan of Care Expiration: 9/1/23  Progress Note Due: 7/26/23  Visit # / Visits authorized: 7 / 20   FOTO Follow Up:   FOTO Follow UP:      Precautions: Standard     Time In: 8:15 AM   Time Out: 9:05 AM   Total Appointment Time (timed & untimed codes): 50 minutes    FOTO 1st:   FOTO 3rd:  FOTO 10th:      SUBJECTIVE     Pt reports: hip flexors were sore after last session but got better with some stretching.     He was compliant with home exercise program.  Response to previous treatment: too early   Functional change: too early     Pain: 0/10  Location: right knee      OBJECTIVE       Biodex Strength Assessment   R: 142.4 foot/lbs at 60 degrees   L: 179.5 foot/lbs at 60 degrees     See media for full evaluation     Treatment       Irasema received the treatments listed below:      Therapeutic exercises to develop strength, endurance, ROM, and flexibility for 10 minutes including:  Bike x 7:00 to improve functional endurance and tissue extensibility   Pt education       Manual therapy techniques: Joint mobilizations were applied to the: R knee for 00 minutes, including:        Therapeutic activities to improve functional performance for 23  minutes, including:  Mason Split Squat 3 x 12 @ 95# per leg   Side Plank 3 x :30   Knee Extension 4 x 8 @ 55# single leg   Single Leg Hip Thrust 3 x 10 per leg       Neuromuscular re-education activities to improve: Balance,  Coordination, Kinesthetic, and Sense for 17 minutes. The following activities were included:  Half Kneeling Hip Flexor Stretch with glute activation 2 x 10 x :03   Forward lean hip flexor lift against cable 2 x 10 @ 17#   Single Leg Hip Extension on shuttle 1 cord 2 x 10       Patient Education and Home Exercises     Home Exercises Provided and Patient Education Provided     Education provided:   - Decreased dynamic valgus, importance of regaining conditioning, muscular tolerance to basketball activities     Written Home Exercises Provided: Patient instructed to cont prior HEP. Exercises were reviewed and Irasema was able to demonstrate them prior to the end of the session.  Irasema demonstrated good  understanding of the education provided. See EMR under Patient Instructions for exercises provided during therapy sessions    ASSESSMENT     Irasema with some hip flexor tightness that improved with stretching and eccentric exercises. Otherwise good tolerance to progression of LE strengthening.     Irasema Is progressing well towards his goals.     Pt prognosis is Excellent.     Pt will continue to benefit from skilled outpatient physical therapy to address the deficits listed in the problem list box on initial evaluation, provide pt/family education and to maximize pt's level of independence in the home and community environment.     Pt's spiritual, cultural and educational needs considered and pt agreeable to plan of care and goals.     Anticipated barriers to physical therapy: none     Goals:   Short Term Goals: 4-6 weeks  1. Pt will be compliant with HEP 50% of prescribed amount.   2. The pt to demo improvement in b/l squatting technique with minimal valgus b/l   3.  Pt to demo single leg squat to appropriate depth without dynamic valgus      Long Term Goals: 8-12 weeks   Pt will be compliant with % of prescribed amount.   Pt will demonstrate uncompensated pain free running mechanics x 10:0   Pt will  demo good tolerance to cutting/change of direction exercises to demo improved tolerance to basketball activities.   The pt will report full participation in ADLs and IADLs without restrictions related to R knee .      Plan   Plan of care Certification: 6/12/2023 to 9/1/23.     Outpatient Physical Therapy 2 times weekly for 12 weeks to include the following interventions: Manual Therapy, Neuromuscular Re-ed, Patient Education, Therapeutic Activities, and Therapeutic Exercise.     Erik Juarez, PT, DPT